# Patient Record
Sex: FEMALE | Race: BLACK OR AFRICAN AMERICAN | NOT HISPANIC OR LATINO
[De-identification: names, ages, dates, MRNs, and addresses within clinical notes are randomized per-mention and may not be internally consistent; named-entity substitution may affect disease eponyms.]

---

## 2017-10-03 ENCOUNTER — RESULT REVIEW (OUTPATIENT)
Age: 52
End: 2017-10-03

## 2018-01-21 ENCOUNTER — TRANSCRIPTION ENCOUNTER (OUTPATIENT)
Age: 53
End: 2018-01-21

## 2018-06-14 ENCOUNTER — RESULT REVIEW (OUTPATIENT)
Age: 53
End: 2018-06-14

## 2020-01-02 ENCOUNTER — TRANSCRIPTION ENCOUNTER (OUTPATIENT)
Age: 55
End: 2020-01-02

## 2020-11-20 ENCOUNTER — RESULT REVIEW (OUTPATIENT)
Age: 55
End: 2020-11-20

## 2020-11-21 ENCOUNTER — EMERGENCY (EMERGENCY)
Facility: HOSPITAL | Age: 55
LOS: 1 days | Discharge: HOME | End: 2020-11-21
Attending: EMERGENCY MEDICINE | Admitting: EMERGENCY MEDICINE
Payer: COMMERCIAL

## 2020-11-21 VITALS
DIASTOLIC BLOOD PRESSURE: 60 MMHG | SYSTOLIC BLOOD PRESSURE: 113 MMHG | RESPIRATION RATE: 18 BRPM | OXYGEN SATURATION: 100 % | HEART RATE: 100 BPM

## 2020-11-21 VITALS
SYSTOLIC BLOOD PRESSURE: 114 MMHG | HEART RATE: 91 BPM | OXYGEN SATURATION: 100 % | DIASTOLIC BLOOD PRESSURE: 80 MMHG | RESPIRATION RATE: 18 BRPM | TEMPERATURE: 98 F

## 2020-11-21 LAB
ALBUMIN SERPL ELPH-MCNC: 3.7 G/DL — SIGNIFICANT CHANGE UP (ref 3.3–5)
ALP SERPL-CCNC: 65 U/L — SIGNIFICANT CHANGE UP (ref 40–120)
ALT FLD-CCNC: 14 U/L — SIGNIFICANT CHANGE UP (ref 4–33)
ANION GAP SERPL CALC-SCNC: 9 MMO/L — SIGNIFICANT CHANGE UP (ref 7–14)
APPEARANCE UR: CLEAR — SIGNIFICANT CHANGE UP
APTT BLD: 36.5 SEC — HIGH (ref 27–36.3)
AST SERPL-CCNC: 19 U/L — SIGNIFICANT CHANGE UP (ref 4–32)
BACTERIA # UR AUTO: NEGATIVE — SIGNIFICANT CHANGE UP
BASE EXCESS BLDV CALC-SCNC: 0.4 MMOL/L — SIGNIFICANT CHANGE UP
BASOPHILS # BLD AUTO: 0.01 K/UL — SIGNIFICANT CHANGE UP (ref 0–0.2)
BASOPHILS NFR BLD AUTO: 0.2 % — SIGNIFICANT CHANGE UP (ref 0–2)
BILIRUB SERPL-MCNC: 0.7 MG/DL — SIGNIFICANT CHANGE UP (ref 0.2–1.2)
BILIRUB UR-MCNC: NEGATIVE — SIGNIFICANT CHANGE UP
BLOOD GAS VENOUS - CREATININE: 1.19 MG/DL — SIGNIFICANT CHANGE UP (ref 0.5–1.3)
BLOOD GAS VENOUS - FIO2: 21 — SIGNIFICANT CHANGE UP
BLOOD UR QL VISUAL: NEGATIVE — SIGNIFICANT CHANGE UP
BUN SERPL-MCNC: 15 MG/DL — SIGNIFICANT CHANGE UP (ref 7–23)
CALCIUM SERPL-MCNC: 9.1 MG/DL — SIGNIFICANT CHANGE UP (ref 8.4–10.5)
CHLORIDE BLDV-SCNC: 107 MMOL/L — SIGNIFICANT CHANGE UP (ref 96–108)
CHLORIDE SERPL-SCNC: 106 MMOL/L — SIGNIFICANT CHANGE UP (ref 98–107)
CO2 SERPL-SCNC: 23 MMOL/L — SIGNIFICANT CHANGE UP (ref 22–31)
COLOR SPEC: YELLOW — SIGNIFICANT CHANGE UP
CREAT SERPL-MCNC: 1.09 MG/DL — SIGNIFICANT CHANGE UP (ref 0.5–1.3)
EOSINOPHIL # BLD AUTO: 0.05 K/UL — SIGNIFICANT CHANGE UP (ref 0–0.5)
EOSINOPHIL NFR BLD AUTO: 0.9 % — SIGNIFICANT CHANGE UP (ref 0–6)
GAS PNL BLDV: 138 MMOL/L — SIGNIFICANT CHANGE UP (ref 136–146)
GLUCOSE BLDV-MCNC: 83 MG/DL — SIGNIFICANT CHANGE UP (ref 70–99)
GLUCOSE SERPL-MCNC: 88 MG/DL — SIGNIFICANT CHANGE UP (ref 70–99)
GLUCOSE UR-MCNC: NEGATIVE — SIGNIFICANT CHANGE UP
HCO3 BLDV-SCNC: 23 MMOL/L — SIGNIFICANT CHANGE UP (ref 20–27)
HCT VFR BLD CALC: 42 % — SIGNIFICANT CHANGE UP (ref 34.5–45)
HCT VFR BLDV CALC: 42.5 % — SIGNIFICANT CHANGE UP (ref 34.5–45)
HGB BLD-MCNC: 13.1 G/DL — SIGNIFICANT CHANGE UP (ref 11.5–15.5)
HGB BLDV-MCNC: 13.8 G/DL — SIGNIFICANT CHANGE UP (ref 11.5–15.5)
HYALINE CASTS # UR AUTO: NEGATIVE — SIGNIFICANT CHANGE UP
IMM GRANULOCYTES NFR BLD AUTO: 0.2 % — SIGNIFICANT CHANGE UP (ref 0–1.5)
INR BLD: 1.12 — SIGNIFICANT CHANGE UP (ref 0.88–1.16)
KETONES UR-MCNC: SIGNIFICANT CHANGE UP
LACTATE BLDV-MCNC: 1.4 MMOL/L — SIGNIFICANT CHANGE UP (ref 0.5–2)
LEUKOCYTE ESTERASE UR-ACNC: NEGATIVE — SIGNIFICANT CHANGE UP
LIDOCAIN IGE QN: 15.7 U/L — SIGNIFICANT CHANGE UP (ref 7–60)
LYMPHOCYTES # BLD AUTO: 1.33 K/UL — SIGNIFICANT CHANGE UP (ref 1–3.3)
LYMPHOCYTES # BLD AUTO: 25.1 % — SIGNIFICANT CHANGE UP (ref 13–44)
MCHC RBC-ENTMCNC: 23.8 PG — LOW (ref 27–34)
MCHC RBC-ENTMCNC: 31.2 % — LOW (ref 32–36)
MCV RBC AUTO: 76.4 FL — LOW (ref 80–100)
MONOCYTES # BLD AUTO: 0.65 K/UL — SIGNIFICANT CHANGE UP (ref 0–0.9)
MONOCYTES NFR BLD AUTO: 12.3 % — SIGNIFICANT CHANGE UP (ref 2–14)
NEUTROPHILS # BLD AUTO: 3.24 K/UL — SIGNIFICANT CHANGE UP (ref 1.8–7.4)
NEUTROPHILS NFR BLD AUTO: 61.3 % — SIGNIFICANT CHANGE UP (ref 43–77)
NITRITE UR-MCNC: NEGATIVE — SIGNIFICANT CHANGE UP
NRBC # FLD: 0 K/UL — SIGNIFICANT CHANGE UP (ref 0–0)
PCO2 BLDV: 48 MMHG — SIGNIFICANT CHANGE UP (ref 41–51)
PH BLDV: 7.35 PH — SIGNIFICANT CHANGE UP (ref 7.32–7.43)
PH UR: 5.5 — SIGNIFICANT CHANGE UP (ref 5–8)
PLATELET # BLD AUTO: 302 K/UL — SIGNIFICANT CHANGE UP (ref 150–400)
PMV BLD: 10.2 FL — SIGNIFICANT CHANGE UP (ref 7–13)
PO2 BLDV: 27 MMHG — LOW (ref 35–40)
POTASSIUM BLDV-SCNC: 4.4 MMOL/L — SIGNIFICANT CHANGE UP (ref 3.4–4.5)
POTASSIUM SERPL-MCNC: 4.3 MMOL/L — SIGNIFICANT CHANGE UP (ref 3.5–5.3)
POTASSIUM SERPL-SCNC: 4.3 MMOL/L — SIGNIFICANT CHANGE UP (ref 3.5–5.3)
PROT SERPL-MCNC: 6.6 G/DL — SIGNIFICANT CHANGE UP (ref 6–8.3)
PROT UR-MCNC: 20 — SIGNIFICANT CHANGE UP
PROTHROM AB SERPL-ACNC: 12.8 SEC — SIGNIFICANT CHANGE UP (ref 10.6–13.6)
RBC # BLD: 5.5 M/UL — HIGH (ref 3.8–5.2)
RBC # FLD: 16.3 % — HIGH (ref 10.3–14.5)
RBC CASTS # UR COMP ASSIST: SIGNIFICANT CHANGE UP (ref 0–?)
SAO2 % BLDV: 42.6 % — LOW (ref 60–85)
SODIUM SERPL-SCNC: 138 MMOL/L — SIGNIFICANT CHANGE UP (ref 135–145)
SP GR SPEC: 1.03 — SIGNIFICANT CHANGE UP (ref 1–1.04)
SQUAMOUS # UR AUTO: SIGNIFICANT CHANGE UP
UROBILINOGEN FLD QL: NORMAL — SIGNIFICANT CHANGE UP
WBC # BLD: 5.29 K/UL — SIGNIFICANT CHANGE UP (ref 3.8–10.5)
WBC # FLD AUTO: 5.29 K/UL — SIGNIFICANT CHANGE UP (ref 3.8–10.5)
WBC UR QL: SIGNIFICANT CHANGE UP (ref 0–?)

## 2020-11-21 PROCEDURE — 99285 EMERGENCY DEPT VISIT HI MDM: CPT

## 2020-11-21 PROCEDURE — 71045 X-RAY EXAM CHEST 1 VIEW: CPT | Mod: 26

## 2020-11-21 PROCEDURE — 74177 CT ABD & PELVIS W/CONTRAST: CPT | Mod: 26

## 2020-11-21 PROCEDURE — 76705 ECHO EXAM OF ABDOMEN: CPT | Mod: 26

## 2020-11-21 RX ORDER — MORPHINE SULFATE 50 MG/1
4 CAPSULE, EXTENDED RELEASE ORAL ONCE
Refills: 0 | Status: DISCONTINUED | OUTPATIENT
Start: 2020-11-21 | End: 2020-11-21

## 2020-11-21 RX ORDER — DIAZEPAM 5 MG
1 TABLET ORAL
Qty: 6 | Refills: 0
Start: 2020-11-21 | End: 2020-11-22

## 2020-11-21 RX ORDER — LIDOCAINE 4 G/100G
1 CREAM TOPICAL ONCE
Refills: 0 | Status: COMPLETED | OUTPATIENT
Start: 2020-11-21 | End: 2020-11-21

## 2020-11-21 RX ORDER — KETOROLAC TROMETHAMINE 30 MG/ML
15 SYRINGE (ML) INJECTION ONCE
Refills: 0 | Status: DISCONTINUED | OUTPATIENT
Start: 2020-11-21 | End: 2020-11-21

## 2020-11-21 RX ORDER — DIAZEPAM 5 MG
5 TABLET ORAL ONCE
Refills: 0 | Status: DISCONTINUED | OUTPATIENT
Start: 2020-11-21 | End: 2020-11-21

## 2020-11-21 RX ADMIN — MORPHINE SULFATE 4 MILLIGRAM(S): 50 CAPSULE, EXTENDED RELEASE ORAL at 10:35

## 2020-11-21 RX ADMIN — Medication 15 MILLIGRAM(S): at 14:38

## 2020-11-21 RX ADMIN — LIDOCAINE 1 PATCH: 4 CREAM TOPICAL at 14:38

## 2020-11-21 RX ADMIN — Medication 5 MILLIGRAM(S): at 14:38

## 2020-11-21 NOTE — ED PROVIDER NOTE - ATTENDING CONTRIBUTION TO CARE
Attending Statement: I have reviewed and agree with all pertinent clinical information, including history and physical exam and agree with treatment plan of the PA, except as noted.  56yo F no sig pmhx pw abdominal pain x one day. Pain constant, felt "like gas pain" located on right side, non radiating, not pleuritic not positional. No associated nausea/vomit/diarrhea no dysuria no hematuria no fever/chills. no SOB or PAGE. Saw GYN a day ago for routine exam, no pain then. no vag bleeding. denies preg.  Vital signs noted. tearful, uncomfortable. mmm. no work of breathing. soft obese female tender right side abdomen, no rebound. no cvat. neg murphys sign no ecchymosis or bruising.   plan labs, urine, cxr, RUQ us, ct abdomen-pelvis, pain med, re assess

## 2020-11-21 NOTE — ED PROVIDER NOTE - NSTIMEPROVIDERCAREINITIATE_GEN_ER
Called pt for a f/u visit and left a message for the patient due to the patient not answering the phone.  
21-Nov-2020 09:41

## 2020-11-21 NOTE — ED PROVIDER NOTE - OBJECTIVE STATEMENT
Pt is a 56 y/o F no pertinent PMHx p/w abdominal pain since yesterday.  Pt states yesterday morning she woke up and noticed mild right sided abdominal pain described as sharp "gas" pain.  She notes pain has been constant since, gradually worsening.  She notes pain worsens with twisting, walking and laying on that side.  She states today pain has become severe.  She states yesterday afternoon she had a routine OB/GYN appointment, during which she had a pelvic exam, which she states was normal.  Pt denies any fevers, chills, nausea, vomiting, cough, chest pain, SOB, palpitations, lightheadedness, dysuria, cloudy urine, hematuria, diarrhea, constipation, melena, brbpr, calf pain/swelling, h/o dvt/pe, hemoptysis, recent surgeries, recent prolonged immobilization, OCP use, h/o malignancy, illicit drug use, ETOH, or any other specific complaints.

## 2020-11-21 NOTE — ED PROVIDER NOTE - CLINICAL SUMMARY MEDICAL DECISION MAKING FREE TEXT BOX
Pt is a 56 y/o F no pertinent PMHx p/w abdominal pain since yesterday -- possible cholecystitis, possible appendicitis, possible musculoskeletal pain, no e/o cellulitis or zoster -- labs, ua, ucx, lipase, CT abd and pelvis, RUQ sono, upright CXR

## 2020-11-21 NOTE — ED ADULT TRIAGE NOTE - CHIEF COMPLAINT QUOTE
Arrives from home for sharp sudden RUQ pain starting last night, denies N/V/D or constipation. Denies PMH

## 2020-11-21 NOTE — ED ADULT NURSE NOTE - OBJECTIVE STATEMENT
Pt c/o rt upper quadrant pain worse upon movement--pt examined by MD and cat scan offered--Pt had #20 placed rt antecubital--labs drawn and sent

## 2020-11-21 NOTE — ED PROVIDER NOTE - NSFOLLOWUPINSTRUCTIONS_ED_ALL_ED_FT
Advance activity as tolerated.  Continue all previously prescribed medications as directed unless otherwise instructed.  Take Motrin (also sold as Advil or Ibuprofen) 400-600 mg (two or three 200 mg over the counter pills) every 8 hours as needed for moderate pain or fevers-- take with food. Take Tylenol 650mg (Two 325 mg pills) every 4-6 hours as needed for pain or fevers.  Take Valium 5 mg every 8 hours as needed for muscle spasm -- causes drowsiness; DO NOT drink alcohol, drive, or operate heavy machinery with this medication.  Apply lidocaine patch to affected area; this is available over the counter, follow instructions on its packaging.  Follow up with your primary care physician in 48-72 hours- bring copies of your results.  Return to the ER for worsening or persistent symptoms, including but not limited to worsening/persistent pain, numbness, weakness, chest pain, shortness of breath, palpitations, lightheadedness, passing out, and/or ANY NEW OR CONCERNING SYMPTOMS. If you have issues obtaining follow up, please call: [phone] to obtain a doctor or specialist who takes your insurance in your area.  You may call [phone] to make an appointment with the internal medicine clinic.

## 2020-11-21 NOTE — ED PROVIDER NOTE - PATIENT PORTAL LINK FT
You can access the FollowMyHealth Patient Portal offered by [facility] by registering at the following website: [email or web address]. By joining [employer]’s FollowMyHealth portal, you will also be able to view your health information using other applications (apps) compatible with our system.

## 2020-11-21 NOTE — ED PROVIDER NOTE - PROGRESS NOTE DETAILS
DENISE GARCIAOS:  Imaging studies negative for any acute findings.  Pt notes feeling better.  Pt ambulating around results waiting independently without assistance.  Pt requesting to go home.  PT medically stable for discharge.  Strict return precautions given.  Pt to follow up with PMD.

## 2020-11-22 LAB
CULTURE RESULTS: SIGNIFICANT CHANGE UP
SPECIMEN SOURCE: SIGNIFICANT CHANGE UP

## 2021-04-12 ENCOUNTER — TRANSCRIPTION ENCOUNTER (OUTPATIENT)
Age: 56
End: 2021-04-12

## 2022-01-26 ENCOUNTER — EMERGENCY (EMERGENCY)
Facility: HOSPITAL | Age: 57
LOS: 0 days | Discharge: HOME | End: 2022-01-26
Attending: STUDENT IN AN ORGANIZED HEALTH CARE EDUCATION/TRAINING PROGRAM
Payer: COMMERCIAL

## 2022-01-26 VITALS
TEMPERATURE: 98 F | RESPIRATION RATE: 18 BRPM | OXYGEN SATURATION: 98 % | DIASTOLIC BLOOD PRESSURE: 74 MMHG | SYSTOLIC BLOOD PRESSURE: 112 MMHG | HEART RATE: 80 BPM

## 2022-01-26 VITALS
HEIGHT: 63 IN | TEMPERATURE: 98 F | RESPIRATION RATE: 17 BRPM | OXYGEN SATURATION: 99 % | HEART RATE: 89 BPM | SYSTOLIC BLOOD PRESSURE: 111 MMHG | WEIGHT: 281.97 LBS | DIASTOLIC BLOOD PRESSURE: 76 MMHG

## 2022-01-26 DIAGNOSIS — H57.89 OTHER SPECIFIED DISORDERS OF EYE AND ADNEXA: ICD-10-CM

## 2022-01-26 DIAGNOSIS — S05.01XA INJURY OF CONJUNCTIVA AND CORNEAL ABRASION WITHOUT FOREIGN BODY, RIGHT EYE, INITIAL ENCOUNTER: ICD-10-CM

## 2022-01-26 DIAGNOSIS — S05.02XA INJURY OF CONJUNCTIVA AND CORNEAL ABRASION WITHOUT FOREIGN BODY, LEFT EYE, INITIAL ENCOUNTER: ICD-10-CM
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
## 2022-01-26 PROCEDURE — 99282 EMERGENCY DEPT VISIT SF MDM: CPT

## 2022-01-26 NOTE — ED PROVIDER NOTE - PATIENT PORTAL LINK FT
You can access the FollowMyHealth Patient Portal offered by [facility] by registering at the following website: [email or web address]. By joining [employer]’s FollowMyHealth portal, you will also be able to view your health information using other applications (apps) compatible with our system.

## 2022-01-26 NOTE — ED PROVIDER NOTE - PHYSICAL EXAMINATION
VITAL SIGNS: I have reviewed nursing notes and confirm.   GEN: Well-developed; well-nourished; in no acute distress. Speaking full sentences.  SKIN: Warm, pink, no rash, no diaphoresis, no cyanosis, well perfused.   HEAD: Normocephalic; atraumatic. No scalp lacerations, no abrasions.  NECK: Supple; non tender.   EYES: Pupils 3mm equal, round, reactive to light and accomodation, (+) mild conjunctival injection b/l, tearing, no fb, and sclera clear. Extra-ocular movements intact bilaterally.  ENT: No nasal discharge; airway clear. Trachea is midline. ORAL: No oropharyngeal exudates or erythema. Normal dentition.  CV: Regular rate and rhythm. S1, S2 normal; no murmurs, gallops, or rubs. No lower extremity pitting edema bilaterally. Capillary refill < 2 seconds throughout. Distal pulses intact 2+ throughout.  RESP: CTA bilaterally. No wheezes, rales, or rhonchi.   ABD: Normal bowel sounds, soft, non-distended, non-tender, no rebound, no guarding, no rigidity, no hepatosplenomegaly. No CVA tenderness bilaterally.  MSK: Normal range of motion and movement of all 4 extremities. No joint or muscular pain throughout. No clubbing.   BACK: No thoracolumbar midline or paravertebral tenderness. No step-offs or obvious deformities.   NEURO: Alert & oriented x 3, Grossly unremarkable. Sensory and motor intact throughout. No focal deficits. Gait: Fluid. Normal speech and coordination.   PSYCH: Cooperative, appropriate.

## 2022-01-26 NOTE — ED PROVIDER NOTE - NSFOLLOWUPINSTRUCTIONS_ED_ALL_ED_FT
Take ofloxacin drops 2 drops every 6 hours until resolution.    Take acetaminophen 650 mg orally every 6-8 hours for pain control as needed. Please do not exceed 4,000 mg of acetaminophen during a 24 hours period. Acetaminophen can be found in many over-the-counter cold medications as well as opioid medications that may be given for pain.    Take ibuprofen (also known as MOTRIN or ADVIL) 400 mg orally every 6-8 hours for pain control as needed with food to avoid an upset stomach. Ibuprofen can be found in many over-the-counter medications. Please do not take ibuprofen if you have a bleeding disorder, stomach or gastrointestinal ulcer, or liver disease.    If needed, you can alternate these medications so that you can take one medication every 3 hours. For example, at noon take ibuprofen, then at 3PM take acetaminophen, then at 6PM take ibuprofen.     Rest, drink plenty of fluids.  Advance activity as tolerated.  Continue all previously prescribed medications as directed.  Follow up with your PMD 2-3 days and bring copies of your results.  Return to the ER for worsening symptoms.

## 2022-01-26 NOTE — ED PROVIDER NOTE - NSFOLLOWUPCLINICS_GEN_ALL_ED_FT
[facility] - Ophthalmology Clinic  Ophthalmology  [address], [address] [place] [address]  Phone: [phone]  Fax:     [facility] - Ophthalmology  Ophthalmology  [address], [address]  [place] [address]  Phone: [phone]  Fax:     [facility] Ophthalmology  Ophthalmology  [address], [address]  [place] [address]  Phone: [phone]  Fax:

## 2022-01-26 NOTE — ED ADULT NURSE REASSESSMENT NOTE - NS ED NURSE REASSESS COMMENT FT1
Patient was able to walk with a steady gait , Patient was given meds for pain and was later discharged with follow up instructions

## 2022-01-26 NOTE — ED PROVIDER NOTE - OBJECTIVE STATEMENT
56F P/W b/l eye redness today. Describes cleaning her face with cleanser and began having b/l eye tearing and irritation w/ sandpaper feeling. Denies any visual loss, fevers, headaches, blindness, abdominal pain, nausea/vomiting, chest pain, shortness of breath.

## 2023-03-01 ENCOUNTER — NON-APPOINTMENT (OUTPATIENT)
Age: 58
End: 2023-03-01